# Patient Record
Sex: MALE | Race: WHITE
[De-identification: names, ages, dates, MRNs, and addresses within clinical notes are randomized per-mention and may not be internally consistent; named-entity substitution may affect disease eponyms.]

---

## 2023-05-30 ENCOUNTER — HOSPITAL ENCOUNTER (EMERGENCY)
Dept: HOSPITAL 97 - ER | Age: 63
Discharge: HOME | End: 2023-05-30
Payer: OTHER GOVERNMENT

## 2023-05-30 VITALS — DIASTOLIC BLOOD PRESSURE: 92 MMHG | TEMPERATURE: 99 F | OXYGEN SATURATION: 100 % | SYSTOLIC BLOOD PRESSURE: 152 MMHG

## 2023-05-30 DIAGNOSIS — R29.701: ICD-10-CM

## 2023-05-30 DIAGNOSIS — F17.210: ICD-10-CM

## 2023-05-30 DIAGNOSIS — G45.8: Primary | ICD-10-CM

## 2023-05-30 LAB
BUN BLD-MCNC: 14 MG/DL (ref 7–18)
GLUCOSE SERPLBLD-MCNC: 89 MG/DL (ref 74–106)
HCT VFR BLD CALC: 44 % (ref 39.6–49)
LYMPHOCYTES # SPEC AUTO: 1.1 K/UL (ref 0.7–4.9)
MCV RBC: 93.9 FL (ref 80–100)
PMV BLD: 8.6 FL (ref 7.6–11.3)
POTASSIUM SERPL-SCNC: 4.4 MEQ/L (ref 3.5–5.1)
RBC # BLD: 4.69 M/UL (ref 4.33–5.43)

## 2023-05-30 PROCEDURE — 80048 BASIC METABOLIC PNL TOTAL CA: CPT

## 2023-05-30 PROCEDURE — 36415 COLL VENOUS BLD VENIPUNCTURE: CPT

## 2023-05-30 PROCEDURE — 85025 COMPLETE CBC W/AUTO DIFF WBC: CPT

## 2023-05-30 PROCEDURE — 99282 EMERGENCY DEPT VISIT SF MDM: CPT

## 2023-05-30 PROCEDURE — 93005 ELECTROCARDIOGRAM TRACING: CPT

## 2023-05-30 PROCEDURE — 70450 CT HEAD/BRAIN W/O DYE: CPT

## 2023-05-30 PROCEDURE — 84484 ASSAY OF TROPONIN QUANT: CPT

## 2023-05-30 PROCEDURE — 70551 MRI BRAIN STEM W/O DYE: CPT

## 2023-05-30 NOTE — EDPHYS
Physician Documentation                                                                           

 [facility]                                                                 

Name: Jayson Gaitan                                                                          

Age: 62 yrs                                                                                       

Sex: Male                                                                                         

: 1960                                                                                   

MRN: [id number]                                                                                   

Arrival Date: 2023                                                                          

Time: 09:33                                                                                       

Account#: [id number]                                                                            

Bed IW2                                                                                           

Private MD:                                                                                       

ED Physician Keegan Omer                                                                       

HPI:                                                                                              

                                                                                             

09:45 This 62 yrs old  Male presents to ER via Ambulatory with complaints of Facial  bs3 

      Droop - eye, S/S of Possible Stroke.                                                        

10:20 62-year-old male reports right sided facial numbness yesterday as well as right arm     bs3 

      numbness he does not have associated weakness or changes in his speech although at          

      baseline he has a speech impediment he [facility]     

      he may have a slight facial droop and advised him to come here for CT he currently          

      denies any weakness he denies any changes in his baseline speech and he does not            

      particularly notice that his face is drooping.                                              

                                                                                                  

Historical:                                                                                       

- Allergies:                                                                                      

09:42 No Known Allergies;                                                                     hb  

                                                                                                  

- Immunization history:: Adult Immunizations up to date.                                          

- Social history:: Smoking status: Patient reports the use of cigarette tobacco                   

  products, smokes one pack cigarettes per day.                                                   

                                                                                                  

                                                                                                  

ROS:                                                                                              

10:20 Constitutional: Negative for fever, chills                                              bs3 

10:20 All other systems are negative.                                                             

                                                                                                  

Exam:                                                                                             

10:20 Constitutional:  This is a well developed, well nourished patient who is awake, alert,  bs3 

      and in no acute distress. Head/Face:  Normocephalic, atraumatic. Eyes:  Pupils equal        

      round and reactive to light, extra-ocular motions intact.  Lids and lashes normal.          

      ENT:  mmm, no posterior phyarngeal erythema Neck:  Trachea midline, no thyromegaly, no      

      neck stiffness Chest/axilla:  Normal chest wall appearance and motion.  Nontender with      

      no deformity.  No lesions are appreciated. Cardiovascular:  Regular rate and rhythm         

      with a normal S1 and S2.  symmetric pulses in upper extremities Respiratory:  Lungs         

      have equal breath sounds bilaterally, clear to auscultation, no respiratory distress        

      Abdomen/GI:  Soft, non-tender, no rebound or guarding Skin:  Warm, dry with normal          

      turgor.  Normal color with no rashes, no lesions, and no evidence of cellulitis. MS/        

      Extremity:  Pulses equal, no cyanosis.  Neurovascular intact.  Full, normal range of        

      motion. Neuro:  Awake and alert, GCS 15, oriented to person, place, time, and               

      situation.  Cranial nerves II-XII grossly intact.  Motor strength 5/5 in all                

      extremities.  Sensory grossly intact.   He has asymmetry of his face but he does not        

      particularly have a facial droop Psych:  Awake, alert, with orientation to person,          

      place and time.  Behavior, mood, and affect are within normal limits.                       

                                                                                                  

Vital Signs:                                                                                      

09:38  / 92; Pulse 84; Resp 16; Temp 99(TE); Pulse Ox 100% on R/A; Weight 99.79 kg;     hb  

      Height 5 ft. 0 in. ; Pain 0/10;                                                             

09:38 Body Mass Index 42.97 (99.79 kg, 152.4 cm)                                              hb  

09:38 Pain Scale: Adult                                                                       hb  

                                                                                                  

NIH Stroke Scale Scores:                                                                          

10:20 NIHSS Score: 1                                                                          bs3 

                                                                                                  

MDM:                                                                                              

09:35 Patient medically screened.                                                             bs3 

10:20 Data reviewed: vital signs, nurses notes. ED course: 62-year-old with right-sided       bs3 

      symptoms concerning for possible CVA although atypical he has resolution of the             

      numbness that he had earlier so it is possibly a transient ischemic attack we will do       

      CT brain I initially wanted to do a CTA to look at his vasculature however we are           

      currently limited on contrast therefore we will do an MRI of his brain to evaluate for      

      stroke he [facility]            

      further work-up if his MRI is negative.                                                     

12:02 ED course: MRI negative for acute pathology patient given a copy of his CT and MRI      bs3 

      instructed to [facility] today.                                                  

12:05 ED course: Normal sinus rhythm at 75 no ST elevation or depression QTc difficult to     bs3 

      interpret.                                                                                  

12:17 ED course: .                                                                            bs3 

12:18 ED course: MRI negative CT negative labs within normal limits advised [facility].                                                      

                                                                                                  

                                                                                             

09:48 Order name: CBC with Diff; Complete Time: 10:48                                         bs3 

                                                                                             

09:48 Order name: BMP; Complete Time: 11:11                                                   bs3 

                                                                                             

09:48 Order name: Troponin High Sensitivity; Complete Time: 11:11                             bs3 

                                                                                             

09:48 Order name: CT Head Brain wo Cont; Complete Time: 10:48                                 bs3 

                                                                                             

09:48 Order name: MRI - Brain Wo Cont; Complete Time: 12:02                                   bs3 

                                                                                             

09:48 Order name: EKG - Nurse/Tech; Complete Time: 11:31                                      bs3 

                                                                                                  

Administered Medications:                                                                         

No medications were administered                                                                  

                                                                                                  

                                                                                                  

Disposition Summary:                                                                              

23 12:03                                                                                    

Discharge Ordered                                                                                 

      Location: Home                                                                          bs3 

      Problem: new                                                                            bs3 

      Symptoms: have improved                                                                 bs3 

      Condition: Stable                                                                       bs3 

      Diagnosis                                                                                   

        - Other transient cerebral ischemic attacks and related syndromes                     bs3 

      Followup:                                                                               bs3 

        - With: Private Physician                                                                  

        - When: Upon discharge from the Emergency Department                                       

        - Reason: Re-evaluation by your physician                                                  

      Discharge Instructions:                                                                     

        - Discharge Summary Sheet                                                             bs3 

        - Transient Ischemic Attack, Easy-to-Read                                             bs3 

      Forms:                                                                                      

        - Medication Reconciliation Form                                                      bs3 

        - Thank You Letter                                                                    bs3 

        - Antibiotic Education                                                                bs3 

        - Prescription Opioid Use                                                             bs3 

                                                                                                  

NIH Stroke Scale - NIH Stroke Score                                                               

Date: 2023                                                                                  

Time: 10:20                                                                                       

Total Score = 1                                                                                   

  10. Dysarthria (speech clarity - read or repeat words) - 0(Normal)                              

  11. Extinction and Inattention (visual/tactile/auditory/spatial/personal) - 0(No                

      abnormality)                                                                                

  1a. Level of Consciousness (LOC) - 0(Alert)                                                     

  1b. Level of Consciousness (LOC) (Month \T\ Age) - 0(Both)                                      

  1c. LOC Commands (Open \T\ Closes Eyes/) - 0(Both)                                          

   2. Best Gaze (Lateral Gaze Paresis) - 0(Normal)                                                

   3. Visual Field Loss - 0(No visual loss)                                                       

   4. Facial Palsy - 1(Minor Paralysis)                                                           

  5a. Left Arm: Motor (10-second hold) - 0(No drift)                                              

  5b. Right Arm: Motor (10-second hold) - 0(No drift)                                             

  6a. Left Leg: Motor (5-second hold - always test supine) - 0(No drift)                          

  6b. Right Leg: Motor (5-second hold - always test supine) - 0(No drift)                         

   7. Limb Ataxia (finger/nose \T\ heel/shin - test with eyes open) - 0(Absent)                   

   8. Sensory Loss (pinprick arms/legs/face) - 0(Normal)                                          

   9. Best Language: Aphasia (description/naming/reading) - 0(No aphasia)                         

Initials: bs3                                                                                     

                                                                                                  

Signatures:                                                                                       

Dispatcher [employer]                           EDMS                                                 

Liz Ramirez RN                     Keegan Yusuf MD MD   bs3                                                  

                                                                                                  

Corrections: (The following items were deleted from the chart)                                    

12:19 12:14 ED [facility] hgb [facility] 6.9, prior to that it was 8, bs3         

      repeat here improved, unclear why it was low as outpatient, but advised repeat              

      with primary care, I discussed this with [facility] staff. bs3                               

                                                                                                  

**************************************************************************************************

## 2023-05-30 NOTE — RAD REPORT
EXAM DESCRIPTION:  MRI - Brain Wo Cont - 5/30/2023 11:42 am

 

CLINICAL HISTORY:  right facial droop, right arm symptosm

 

COMPARISON:  Head CT of earlier the same day

 

TECHNIQUE:  Multiplanar multisequence MRI of the brain performed without IV contrast.

 

FINDINGS:

No evidence of acute infarct or other diffusion signal abnormality.

 

No evidence of acute intracranial hemorrhage or abnormal extra-axial fluid collections.

 

Ventricles are normal in caliber. Midline structures are unremarkable.

 

Scattered minimal subcortical and deep white matter T2/FLAIR hyperintensities, nonspecific, but sugge
stive of chronic small vessel ischemic changes.

 

No mass effect or midline shift.

 

Major vascular flow voids are preserved.

 

Mastoid air cells are well aerated. Mucous retention cyst in the left maxillary sinus

 

 

IMPRESSION:  No acute intracranial process. No evidence of ventriculomegaly or mass effect.

 

Minimal white matter signal abnormalities, nonspecific, but most suggestive of chronic small vessel i
schemic changes.

## 2023-05-30 NOTE — RAD REPORT
EXAM DESCRIPTION:  CT - Head Brain Wo Cont - 5/30/2023 10:14 am

 

CLINICAL HISTORY:  right arm numbness, right facial droop

 

COMPARISON:  No comparisons

 

TECHNIQUE:  Noncontrast head CT images ad were obtained without IV contrast. Multiplanar reformats we
re generated and reviewed.

 

All CT scans are performed using dose optimization technique as appropriate and may include automated
 exposure control or mA/KV adjustment according to patient size.

 

FINDINGS:  No intracranial hemorrhage, mass, or edema. Midline structures are unremarkable.

 

Normal ventricular caliber for age.

 

Gray-white matter differentiation is preserved, without evidence of acute infarct. No abnormal extra-
axial fluid collections.

 

Mastoid air cells are well aerated. Left maxillary sinus mucosal retention cyst.

 

No acute bony findings.

 

 

IMPRESSION:  No evidence of an acute intracranial process.

## 2023-05-31 NOTE — EKG
Test Date:    2023-05-30               Test Time:    10:27:22

Technician:   IBETH                                     

                                                     

MEASUREMENT RESULTS:                                       

Intervals:                                           

Rate:         75                                     

MI:           164                                    

QRSD:         68                                     

QT:           494                                    

QTc:          551                                    

Axis:                                                

P:            44                                     

MI:           164                                    

QRS:          41                                     

T:            146                                    

                                                     

INTERPRETIVE STATEMENTS:                                       

                                                     

Sinus rhythm

Anterior infarct, age undetermined

ST & T wave abnormality, consider lateral ischemia

Prolonged QT

Abnormal ECG

Compared to ECG 07/16/2002 21:08:00

Myocardial infarct finding now present

ST (T wave) deviation now present

Possible ischemia now present

Prolonged QT interval now present



Electronically Signed On 05-31-23 14:37:45 CDT by Shay Johnson